# Patient Record
Sex: MALE
[De-identification: names, ages, dates, MRNs, and addresses within clinical notes are randomized per-mention and may not be internally consistent; named-entity substitution may affect disease eponyms.]

---

## 2018-08-07 ENCOUNTER — HOSPITAL ENCOUNTER (EMERGENCY)
Dept: HOSPITAL 97 - ER | Age: 43
Discharge: HOME | End: 2018-08-07
Payer: SELF-PAY

## 2018-08-07 DIAGNOSIS — Z72.0: ICD-10-CM
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
## 2018-08-07 DIAGNOSIS — S61.310A: Primary | ICD-10-CM
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
## 2018-08-07 DIAGNOSIS — Z23: ICD-10-CM

## 2018-08-07 PROCEDURE — 96372 THER/PROPH/DIAG INJ SC/IM: CPT

## 2018-08-07 PROCEDURE — 0JQJ0ZZ REPAIR RIGHT HAND SUBCUTANEOUS TISSUE AND FASCIA, OPEN APPROACH: ICD-10-PCS

## 2018-08-07 PROCEDURE — 99283 EMERGENCY DEPT VISIT LOW MDM: CPT

## 2018-08-07 PROCEDURE — 90714 TD VACC NO PRESV 7 YRS+ IM: CPT

## 2018-08-07 NOTE — ER
Nurse's Notes                                                                                     

 [facility]                                                                

Name: Otto Irving                                                                                  

Age: 42 yrs                                                                                       

Sex: Male                                                                                         

: 1975                                                                                   

MRN: [id number]                                                                                   

Arrival Date: 2018                                                                          

Time: 10:38                                                                                       

Account#: [id number]                                                                            

Bed 19                                                                                            

Private MD:                                                                                       

Diagnosis: Laceration without foreign body of right index finger with damage to nail              

                                                                                                  

Presentation:                                                                                     

                                                                                             

10:42 Presenting complaint: Patient states: Crush injury to right index finger with           hb  

      compressed air clamp machine. Transition of care: patient was not received from another     

      setting of care. Onset of symptoms was 2018. Care prior to arrival: None.        

10:42 Method Of Arrival: Ambulatory                                                           hb  

10:42 Acuity: KWAME 3                                                                           hb  

13:28 Risk Assessment: Do you want to hurt yourself or someone else? Patient reports no       aj1 

      desire to harm self or others. Initial Sepsis Screen: Does the patient meet any 2           

      criteria? No. Patient's initial sepsis screen is negative. Does the patient have a          

      suspected source of infection? Yes: Skin breakdown/wound.                                   

                                                                                                  

Historical:                                                                                       

- Allergies:                                                                                      

10:44 No Known Allergies;                                                                     hb  

- Home Meds:                                                                                      

10:44 None [Active];                                                                          hb  

- PMHx:                                                                                           

10:44 None;                                                                                   hb  

- PSHx:                                                                                           

10:44 None;                                                                                   hb  

                                                                                                  

- Immunization history:: Last tetanus immunization: unknown.                                      

- Social history:: Smoking status: Patient uses tobacco products, denies chronic                  

  smoking, but will smoke occasionally.                                                           

- Ebola Screening: : No symptoms or risks identified at this time.                                

                                                                                                  

                                                                                                  

Screening:                                                                                        

10:50 Abuse screen: Denies threats or abuse. Denies injuries from another. Nutritional        aj1 

      screening: No deficits noted. Tuberculosis screening: No symptoms or risk factors           

      identified.                                                                                 

13:28 Fall Risk None identified.                                                              aj1 

                                                                                                  

Assessment:                                                                                       

10:50 General: Appears in no apparent distress. uncomfortable, Behavior is calm, cooperative, aj1 

      appropriate for age. Pain: Complains of pain in right index finger. Neuro: Level of         

      Consciousness is awake, alert, obeys commands. Cardiovascular: Patient's skin is warm       

      and dry. Respiratory: Airway is patent Respiratory effort is even, unlabored,               

      Respiratory pattern is regular, symmetrical. GI: No signs and/or symptoms were reported     

      involving the gastrointestinal system. : No signs and/or symptoms were reported           

      regarding the genitourinary system. EENT: No signs and/or symptoms were reported            

      regarding the EENT system. Derm: No signs and/or symptoms reported regarding the            

      dermatologic system. Skin is pink, warm \T\ dry. normal. Musculoskeletal: Range of          

      motion: limited in DIP of right index finger. Injury Description: Laceration sustained      

      to dorsal aspect of distal phalanx of right index finger and palmar aspect of distal        

      phalanx of right index finger is bleeding moderately.                                       

11:50 Reassessment: Patient appears in no apparent distress at this time. No changes from     aj1 

      previously documented assessment. Patient and/or family updated on plan of care and         

      expected duration. Pain level reassessed. Patient is alert, oriented x 3, equal             

      unlabored respirations, skin warm/dry/pink.                                                 

12:23 Reassessment: WILLIAM Strange at bedside performing laceration repair.                    aj1 

12:50 Reassessment: Patient appears in no apparent distress at this time. No changes from     aj1 

      previously documented assessment. Patient and/or family updated on plan of care and         

      expected duration. Pain level reassessed. Patient is alert, oriented x 3, equal             

      unlabored respirations, skin warm/dry/pink.                                                 

                                                                                                  

Vital Signs:                                                                                      

10:42  / 107; Pulse 88; Resp 16; Temp 98.1; Pulse Ox 100% on R/A; Pain 9/10;            hb  

13:29  / 89; Pulse 82; Resp 18; Pulse Ox 99% on R/A;                                    aj1 

                                                                                                  

ED Course:                                                                                        

10:38 Patient arrived in ED.                                                                  hb  

10:39 Jay Rivero PA is PHCP.                                                               [id number] 

10:39 Nik Daly MD is Attending Physician.                                                [id number] 

10:44 Triage completed.                                                                       hb  

10:44 Arm band placed on right wrist.                                                         hb  

10:50 Patient has correct armband on for positive identification. Bed in low position. Call   aj1 

      light in reach. Side rails up X 1.                                                          

10:50 No provider procedures requiring assistance completed.                                  aj1 

11:48 X-ray completed. Portable x-ray completed in exam room. Patient tolerated procedure     ag1 

      well.                                                                                       

12:23 Mercedes Subramanian, RN is Primary Nurse.                                                   aj1 

12:35 Barrett Nevarez MD is Referral Physician.                                              [id number] 

13:26 Wound care: to laceration located on DIP of right index finger was cleaned with         aj1 

      Hibiclens, dressed with non-adherent dressing, covered with 4x4 and aluminum finger         

      splint and secured with with tape.                                                          

13:28 Patient did not have IV access during this emergency room visit.                        aj1 

                                                                                                  

Administered Medications:                                                                         

10:59 Drug: Tetanus-Diphtheria Toxoid Adult 0.5 ml {: [employer]. Exp:         aj1 

      2020. Lot #: [id number]. } Route: IM; Site: right deltoid;                                 

11:30 Follow up: Response: No adverse reaction                                                aj1 

10:59 Drug: Ancef 1 grams Route: IM; Site: right gluteus;                                     aj1 

11:30 Follow up: Response: No adverse reaction                                                aj1 

12:20 Drug: Lidocaine (1 %) 1 vials {Note: administered by WILLIAM Strange.} Volume: 5 ml;      aj1 

      Route: Infiltration;                                                                        

13:25 Follow up: Response: No adverse reaction                                                aj1 

                                                                                                  

                                                                                                  

Outcome:                                                                                          

12:35 Discharge ordered by MD. vital 

13:30 Discharged to home ambulatory.                                                          aj1 

13:30 Condition: good                                                                             

13:30 Discharge instructions given to patient, friend, Instructed on discharge instructions,      

      follow up and referral plans. medication usage, Demonstrated understanding of               

      instructions, follow-up care, medications, Prescriptions given X 2.                         

13:30 Patient left the ED.                                                                    aj1 

                                                                                                  

Signatures:                                                                                       

Mercedes Subramanian, RN                     RN   aj1                                                  

Jay Rivero PA PA jr8 Gallaway, Ashley                             1                                                  

Roseanna Bender RN                     RN                                                      

                                                                                                  

**************************************************************************************************

## 2018-08-07 NOTE — EDPHYS
Physician Documentation                                                                           

 [facility]                                                                

Name: Otto Irving                                                                                  

Age: 42 yrs                                                                                       

Sex: Male                                                                                         

: 1975                                                                                   

MRN: [id number]                                                                                   

Arrival Date: 2018                                                                          

Time: 10:38                                                                                       

Account#: [id number]                                                                            

Bed 19                                                                                            

Private MD:                                                                                       

ED Physician Nik Daly                                                                         

HPI:                                                                                              

                                                                                             

10:48 This 42 yrs old  Male presents to ER via Ambulatory with complaints of Finger   jr8 

      Injury.                                                                                     

10:48 The patient or guardian reports decreased range of motion, injury, a laceration, pain,  jr8 

      tenderness. The complaints affect the DIP of right index finger, tip of right index         

      finger. Context: The problem was sustained at work, resulted from getting finger caught     

      in clamp. Onset: The symptoms/episode began/occurred acutely, today. Modifying factors:     

      The symptoms are alleviated by nothing, the symptoms are aggravated by movement.            

      Associated signs and symptoms: The patient has no apparent associated signs or              

      symptoms. Severity of symptoms: At their worst the symptoms were moderate, in the           

      emergency department the symptoms are unchanged. The patient has not experienced            

      similar symptoms in the past. The patient has not recently seen a physician.                

                                                                                                  

Historical:                                                                                       

- Allergies:                                                                                      

10:44 No Known Allergies;                                                                     hb  

- Home Meds:                                                                                      

10:44 None [Active];                                                                          hb  

- PMHx:                                                                                           

10:44 None;                                                                                   hb  

- PSHx:                                                                                           

10:44 None;                                                                                   hb  

                                                                                                  

- Immunization history:: Last tetanus immunization: unknown.                                      

- Social history:: Smoking status: Patient uses tobacco products, denies chronic                  

  smoking, but will smoke occasionally.                                                           

- Ebola Screening: : No symptoms or risks identified at this time.                                

                                                                                                  

                                                                                                  

ROS:                                                                                              

10:48 Eyes: Negative for injury, pain, redness, and discharge, ENT: Negative for injury,      jr8 

      pain, and discharge, Neck: Negative for injury, pain, and swelling, Cardiovascular:         

      Negative for chest pain, palpitations, and edema, Respiratory: Negative for shortness       

      of breath, cough, wheezing, and pleuritic chest pain, Abdomen/GI: Negative for              

      abdominal pain, nausea, vomiting, diarrhea, and constipation, Back: Negative for injury     

      and pain, Skin: Negative for rash, and discoloration, Neuro: Negative for headache,         

      weakness, numbness, tingling, and seizure.                                                  

10:48 MS/extremity: Positive for decreased range of motion, laceration, pain, tenderness, of      

      the right distal index finger.                                                              

                                                                                                  

Exam:                                                                                             

10:48 Cardiovascular:  Regular rate and rhythm with a normal S1 and S2.  No gallops, murmurs, jr8 

      or rubs.  Normal PMI, no JVD.  No pulse deficits. Respiratory:  Lungs have equal breath     

      sounds bilaterally, clear to auscultation and percussion.  No rales, rhonchi or wheezes     

      noted.  No increased work of breathing, no retractions or nasal flaring. Skin:  Warm,       

      dry with normal turgor.  Normal color with no rashes, no lesions, and no evidence of        

      cellulitis. Neuro:  Awake and alert, GCS 15, oriented to person, place, time, and           

      situation.  Cranial nerves II-XII grossly intact.  Motor strength 5/5 in all                

      extremities.  Sensory grossly intact.  Cerebellar exam normal.  Normal gait.                

10:48 Musculoskeletal/extremity: Extremities: grossly normal except: noted in the right           

      distal index finger: Patient has laceration extending from medial to lateral side of        

      finger dorsal aspect including the nail, ROM: limited active range of motion, limited       

      passive range of motion, limited active range of motion due to pain, limited passive        

      range of motion due to pain, Circulation is intact in all extremities. Sensation            

      intact.                                                                                     

                                                                                                  

Vital Signs:                                                                                      

10:42  / 107; Pulse 88; Resp 16; Temp 98.1; Pulse Ox 100% on R/A; Pain 9/10;            hb  

13:29  / 89; Pulse 82; Resp 18; Pulse Ox 99% on R/A;                                    aj1 

                                                                                                  

Laceration:                                                                                       

12:31 Wound Repair of 1cm ( 0.4in ) subcutaneous laceration to DIP of right index finger.     jr8 

      Linear shaped.. Distal neuro/vascular/tendon intact. Anesthesia: Digital block              

      administered with 4 mls of 1% lidocaine. Wound prep: Extensive cleansing with betadine,     

      Wound irrigation with saline, Wound explored extensively, Copious irrigation. Skin          

      closed with 2 4-0 Prolene using interrupted sutures and sterile technique. Patient          

      tolerated well.                                                                             

12:31 Wound Repair of 1cm ( 0.4in ) subcutaneous laceration to dorsal aspect of distal        jr8 

      phalanx of right index finger. Distal neuro/vascular/tendon intact. Anesthesia: Digital     

      block administered with 4 mls of 1% lidocaine. Wound prep: Extensive cleansing with         

      betadine, Wound irrigation with saline, Wound explored extensively, Copious irrigation.     

      Skin closed with 2 4-0 Prolene using interrupted sutures and sterile technique. Patient     

      tolerated well.                                                                             

12:31 Wound Repair of 2.5cm ( 1.0in ) subcutaneous laceration to dorsal aspect of distal      jr8 

      phalanx of right index finger. Distal neuro/vascular/tendon intact. Anesthesia: Digital     

      block administered with 4 mls of 1% lidocaine. Wound prep: Extensive cleansing with         

      betadine, Wound irrigation with saline, Wound explored extensively, Copious irrigation.     

      Skin closed with 5 4-0 Prolene using interrupted sutures and sterile technique. Patient     

      tolerated well.                                                                             

12:31 Wound Repair of 2.5cm ( 1.0in ) tuft laceration to nailbed. Distal                      jr8 

      neuro/vascular/tendon intact. Anesthesia: Digital block administered with 4 mls of 1%       

      lidocaine. Wound prep: Extensive cleansing with betadine, Wound irrigation with saline,     

      Wound explored extensively, Copious irrigation. tuft closed with 4 4-0 Vicryl using         

      interrupted sutures and sterile technique. Patient tolerated well.                          

                                                                                                  

MDM:                                                                                              

10:39 Patient medically screened.                                                             jr8 

12:31 Data reviewed: vital signs, nurses notes, radiologic studies, plain films, and as a     jr8 

      result, I will discharge patient. Data interpreted: Pulse oximetry: on room air is 100      

      %. Interpretation: normal. Counseling: I had a detailed discussion with the patient         

      and/or guardian regarding: the historical points, exam findings, and any diagnostic         

      results supporting the discharge/admit diagnosis, radiology results, the need for           

      outpatient follow up, a hand specialist, to return to the emergency department if           

      symptoms worsen or persist or if there are any questions or concerns that arise at home.    

                                                                                                  

                                                                                             

10:47 Order name: XRAY Hand RIGHT 3 View                                                      [facility] 

                                                                                             

12:00 Order name: RAD; Complete Time: 12:36                                                   EDMS

                                                                                             

10:47 Order name: Dressing - Wound; Complete Time: 10:58                                      jr8 

                                                                                             

10:47 Order name: Gloves, Sterile; Complete Time: 10:58                                       jr8 

                                                                                             

10:47 Order name: Setup Suture Tray; Complete Time: 10:58                                     jr8 

                                                                                                  

Administered Medications:                                                                         

10:59 Drug: Tetanus-Diphtheria Toxoid Adult 0.5 ml {: NETpeas. Exp:         aj1 

      2020. Lot #: [id number]. } Route: IM; Site: right deltoid;                                 

11:30 Follow up: Response: No adverse reaction                                                aj1 

10:59 Drug: Ancef 1 grams Route: IM; Site: right gluteus;                                     aj1 

11:30 Follow up: Response: No adverse reaction                                                aj1 

12:20 Drug: Lidocaine (1 %) 1 vials {Note: administered by WILLIAM Strange.} Volume: 5 ml;      aj1 

      Route: Infiltration;                                                                        

13:25 Follow up: Response: No adverse reaction                                                aj1 

                                                                                                  

                                                                                                  

Disposition:                                                                                      

18:40 Co-signature as Attending Physician, Nik Daly MD.                                    rn  

                                                                                                  

Disposition:                                                                                      

18 12:35 Discharged to Home. Impression: Laceration without foreign body of right index     

  finger with damage to nail.                                                                     

- Condition is Stable.                                                                            

- Discharge Instructions: Finger Fracture, Laceration Care, Adult.                                

- Prescriptions for Ibuprofen 800 mg Oral Tablet - take 1 tablet by ORAL route every 12           

  hours As needed take with food; 20 tablet. Keflex 500 mg Oral Capsule - take 1                  

  capsule by ORAL route every 6 hours for 10 days; 40 capsule.                                    

- Medication Reconciliation Form, Thank You Letter, Antibiotic Education, Prescription            

  Opioid Use form.                                                                                

- Follow up: Barrett Nevarez MD; When: 1 week; Reason: Wound Recheck, Recheck today's            

  complaints, Continuance of care, Staple/Suture removal, Re-evaluation by your                   

  physician.                                                                                      

- Problem is new.                                                                                 

- Symptoms have improved.                                                                         

                                                                                                  

                                                                                                  

                                                                                                  

Signatures:                                                                                       

Dispatcher [employer]                           EDMS                                                 

Mercedes Subramanian RN                     RN   aj1                                                  

Nik Daly MD MD rn Roszak, Josh, PA PA   jr8                                                  

Roseanna Bender RN RN                                                      

                                                                                                  

Corrections: (The following items were deleted from the chart)                                    

13:30 12:35 2018 12:35 Discharged to Home. Impression: Laceration without foreign body  aj1 

      of right index finger with damage to nail. Condition is Stable. Forms are Medication        

      Reconciliation Form, Thank You Letter, Antibiotic Education, Prescription Opioid Use.       

      Follow up: Barrett Nevarez; When: 1 week; Reason: Wound Recheck, Recheck today's             

      complaints, Continuance of care, Staple/Suture removal, Re-evaluation by your               

      physician. Problem is new. Symptoms have improved. jr8                                      

                                                                                                  

**************************************************************************************************

## 2018-08-07 NOTE — RAD REPORT
EXAM DESCRIPTION:  RAD - Hand Right 3 View - 8/7/2018 11:52 am

 

CLINICAL HISTORY:  partial amputation

 

COMPARISON:  No comparisons

 

FINDINGS:  A tuft fracture is present involving the second digit. Moderate soft tissue swelling is ev
ident.

## 2018-08-15 ENCOUNTER — HOSPITAL ENCOUNTER (EMERGENCY)
Dept: HOSPITAL 97 - ER | Age: 43
Discharge: HOME | End: 2018-08-15
Payer: SELF-PAY

## 2018-08-15 DIAGNOSIS — Z48.02: Primary | ICD-10-CM

## 2018-08-15 PROCEDURE — 99281 EMR DPT VST MAYX REQ PHY/QHP: CPT

## 2018-08-15 NOTE — ER
Nurse's Notes                                                                                     

 [facility]                                                                

Name: Otto Irving                                                                                  

Age: 42 yrs                                                                                       

Sex: Male                                                                                         

: 1975                                                                                   

MRN: [id number]                                                                                   

Arrival Date: 08/15/2018                                                                          

Time: 14:48                                                                                       

Account#: [id number]                                                                            

Bed 11                                                                                            

Private MD: None, None                                                                            

Diagnosis: Encounter for removal of sutures                                                       

                                                                                                  

Presentation:                                                                                     

08/15                                                                                             

14:56 Presenting complaint: Patient states: I had stitches put in on last Tuesday, so i came  sg  

      today to see if i can have them taken out. Transition of care: patient was not received     

      from another setting of care. Onset of symptoms was August 15, 2018. Risk Assessment:       

      Do you want to hurt yourself or someone else? Patient reports no desire to harm self or     

      others. Initial Sepsis Screen: Does the patient meet any 2 criteria? No. Patient's          

      initial sepsis screen is negative. Does the patient have a suspected source of              

      infection? No. Patient's initial sepsis screen is negative. Care prior to arrival: None.    

14:56 Acuity: KWAME 5                                                                           sg  

14:56 Method Of Arrival: Ambulatory                                                           sg  

                                                                                                  

Historical:                                                                                       

- Allergies:                                                                                      

14:56 No Known Allergies;                                                                     sg  

- Home Meds:                                                                                      

14:56 None [Active];                                                                          sg  

- PMHx:                                                                                           

14:56 None;                                                                                   sg  

- PSHx:                                                                                           

14:56 None;                                                                                   sg  

                                                                                                  

- Immunization history:: Adult Immunizations up to date.                                          

- Social history:: Smoking status: Patient/guardian denies using tobacco.                         

- Ebola Screening: : Patient negative for fever greater than or equal to 101.5 degrees            

  Fahrenheit, and additional compatible Ebola Virus Disease symptoms Patient denies               

  exposure to infectious person Patient denies travel to an Ebola-affected area in the            

  21 days before illness onset No symptoms or risks identified at this time.                      

                                                                                                  

                                                                                                  

Vital Signs:                                                                                      

14:57  / 90; Pulse 65; Resp 17; Temp 97.7; Pulse Ox 98% on R/A; Pain 0/10;              sg  

                                                                                                  

ED Course:                                                                                        

14:48 Patient arrived in ED.                                                                  sb2 

14:48 None, None is Private Physician.                                                        sb2 

14:56 [facility]                                                        kb  

14:56 Cyrus Ornelas MD is Attending Physician.                                              kb  

14:56 Arm band placed on.                                                                     sg  

14:57 Triage completed.                                                                       sg  

                                                                                                  

Administered Medications:                                                                         

No medications were administered                                                                  

                                                                                                  

                                                                                                  

Outcome:                                                                                          

15:14 Discharge ordered by MD.                                                                kb  

15:39 Patient left the ED.                                                                    sg  

                                                                                                  

Signatures:                                                                                       

Latasha Cedillo, OLEGARIO-C                 FNP-Phillip Sheehan, RN                         RN   sg                                                   

Garima Maki                               sb2                                                  

                                                                                                  

**************************************************************************************************

## 2018-08-15 NOTE — EDPHYS
Physician Documentation                                                                           

 [facility]                                                                

Name: Otto Irving                                                                                  

Age: 42 yrs                                                                                       

Sex: Male                                                                                         

: 1975                                                                                   

MRN: [id number]                                                                                   

Arrival Date: 08/15/2018                                                                          

Time: 14:48                                                                                       

Account#: [id number]                                                                            

Bed 11                                                                                            

Private MD: None, None                                                                            

ED Physician Cyrus Ornelas                                                                       

HPI:                                                                                              

08/15                                                                                             

15:13 This 42 yrs old  Male presents to ER via Ambulatory with complaints of Suture   kb  

      Removal.                                                                                    

15:13 The patient has sutures on the dorsal aspect of distal phalanx of right index finger.   kb  

      Previous treatment: The patient was initially treated on 2018, the care was      

      rendered at [facility], Treatment type: The patient's original       

      treatment included sutures. Sutures/staples progress: The patient has no c/o's. The         

      wound is well-healing with no redness, swelling, discharge, or dehiscence reported. The     

      patient has not experienced similar symptoms in the past. The patient has been recently     

      seen at the [facility] Emergency Department, last week.              

                                                                                                  

Historical:                                                                                       

- Allergies:                                                                                      

14:56 No Known Allergies;                                                                     sg  

- Home Meds:                                                                                      

14:56 None [Active];                                                                          sg  

- PMHx:                                                                                           

14:56 None;                                                                                   sg  

- PSHx:                                                                                           

14:56 None;                                                                                   sg  

                                                                                                  

- Immunization history:: Adult Immunizations up to date.                                          

- Social history:: Smoking status: Patient/guardian denies using tobacco.                         

- Ebola Screening: : Patient negative for fever greater than or equal to 101.5 degrees            

  Fahrenheit, and additional compatible Ebola Virus Disease symptoms Patient denies               

  exposure to infectious person Patient denies travel to an Ebola-affected area in the            

  21 days before illness onset No symptoms or risks identified at this time.                      

                                                                                                  

                                                                                                  

ROS:                                                                                              

15:07 Constitutional: Negative for fever, chills, and weight loss, Cardiovascular: Negative   kb  

      for chest pain, palpitations, and edema, Respiratory: Negative for shortness of breath,     

      cough, wheezing, and pleuritic chest pain, Abdomen/GI: Negative for abdominal pain,         

      nausea, vomiting, diarrhea, and constipation, MS/Extremity: Negative for injury and         

      deformity, Neuro: Negative for headache, weakness, numbness, tingling, and seizure.         

15:07 Skin: Positive for laceration(s), of the dorsal aspect of distal phalanx of right index     

      finger.                                                                                     

                                                                                                  

Exam:                                                                                             

15:07 Constitutional:  This is a well developed, well nourished patient who is awake, alert,  kb  

      and in no acute distress. Head/Face:  Normocephalic, atraumatic. Chest/axilla:  Normal      

      chest wall appearance and motion.  Nontender with no deformity.  No lesions are             

      appreciated. Cardiovascular:  Regular rate and rhythm with a normal S1 and S2.  No          

      gallops, murmurs, or rubs.  Normal PMI, no JVD.  No pulse deficits. Respiratory:  Lungs     

      have equal breath sounds bilaterally, clear to auscultation and percussion.  No rales,      

      rhonchi or wheezes noted.  No increased work of breathing, no retractions or nasal          

      flaring. Abdomen/GI:  Soft, non-tender, with normal bowel sounds.  No distension or         

      tympany.  No guarding or rebound.  No evidence of tenderness throughout. MS/ Extremity:     

       Pulses equal, no cyanosis.  Neurovascular intact.  Full, normal range of motion.           

      Neuro:  Awake and alert, GCS 15, oriented to person, place, time, and situation.            

      Cranial nerves II-XII grossly intact.  Motor strength 5/5 in all extremities.  Sensory      

      grossly intact.  Cerebellar exam normal.  Normal gait.                                      

15:07 Skin: Wound recheck: Suture laceration closure: the wound is healing well, the edges        

      are well approximated, no evidence of dehiscence, no drainage, no erythema, no swelling.    

                                                                                                  

Vital Signs:                                                                                      

14:57  / 90; Pulse 65; Resp 17; Temp 97.7; Pulse Ox 98% on R/A; Pain 0/10;              sg  

                                                                                                  

Procedures:                                                                                       

15:07 Suture/Staple removal: Removed 10 sutures, from dorsal aspect of distal phalanx of      kb  

      right index finger, site appears well healed, dressed with band aid, Patient tolerated      

      well.                                                                                       

                                                                                                  

MDM:                                                                                              

14:57 Patient medically screened.                                                             kb  

15:07 Data reviewed: vital signs, nurses notes. Data interpreted: Pulse oximetry: on room air kb  

      is 98 %. Interpretation: normal. Counseling: I had a detailed discussion with the           

      patient and/or guardian regarding: the historical points, exam findings, and any            

      diagnostic results supporting the discharge/admit diagnosis, the need for outpatient        

      follow up, a family practitioner, to return to the emergency department if symptoms         

      worsen or persist or if there are any questions or concerns that arise at home.             

                                                                                                  

Administered Medications:                                                                         

No medications were administered                                                                  

                                                                                                  

                                                                                                  

Disposition:                                                                                      

19:06 Co-signature as Attending Physician, Cyrus Ornelas MD I agree with the assessment and   kdr 

      plan of care.                                                                               

                                                                                                  

Disposition:                                                                                      

08/15/18 15:14 Discharged to Home. Impression: Encounter for removal of sutures.                  

- Condition is Stable.                                                                            

- Discharge Instructions: Suture Removal, Care After.                                             

                                                                                                  

- Medication Reconciliation Form, Thank You Letter, Antibiotic Education, Prescription            

  Opioid Use form.                                                                                

- Follow up: Emergency Department; When: As needed; Reason: Worsening of condition.               

  Follow up: Private Physician; When: 2 - 3 days; Reason: Recheck today's complaints,             

  Continuance of care, Re-evaluation by your physician.                                           

                                                                                                  

                                                                                                  

                                                                                                  

Signatures:                                                                                       

Latasha Cedillo, SUMMER MELVIN-Phillip Sheehan RN RN sg Rittger, Kevin, MD MD   [facility]                                                  

                                                                                                  

Corrections: (The following items were deleted from the chart)                                    

15:39 15:14 08/15/2018 15:14 Discharged to Home. Impression: Encounter for removal of         sg  

      sutures. Condition is Stable. Forms are Medication Reconciliation Form, Thank You           

      Letter, Antibiotic Education, Prescription Opioid Use. Follow up: Emergency Department;     

      When: As needed; Reason: Worsening of condition. Follow up: Private Physician; When: 2      

      - 3 days; Reason: Recheck today's complaints, Continuance of care, Re-evaluation by         

      your physician. kb                                                                          

                                                                                                  

**************************************************************************************************

## 2023-08-03 ENCOUNTER — HOSPITAL ENCOUNTER (EMERGENCY)
Dept: HOSPITAL 97 - ER | Age: 48
Discharge: HOME | End: 2023-08-03
Payer: SELF-PAY

## 2023-08-03 VITALS — OXYGEN SATURATION: 99 % | DIASTOLIC BLOOD PRESSURE: 93 MMHG | SYSTOLIC BLOOD PRESSURE: 172 MMHG | TEMPERATURE: 98.6 F

## 2023-08-03 DIAGNOSIS — S02.2XXA: Primary | ICD-10-CM

## 2023-08-03 PROCEDURE — 99283 EMERGENCY DEPT VISIT LOW MDM: CPT

## 2023-08-03 PROCEDURE — 70160 X-RAY EXAM OF NASAL BONES: CPT
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
## 2023-08-03 NOTE — RAD REPORT
EXAM DESCRIPTION:  RAD - Nasal Bones - 8/3/2023 5:16 pm

 

CLINICAL HISTORY:  FACIAL PAIN

 

COMPARISON:  No comparisons

 

FINDINGS:  Minimal nasal bone fracture is seen. The paranasal sinuses and mastoids are clear.

 

IMPRESSION:  Minimal nasal bone fracture.

## 2023-08-03 NOTE — EDPHYS
Physician Documentation                                                                           

 [facility]                                                                 

Name: Otto Irving                                                                                  

Age: 47 yrs                                                                                       

Sex: Male                                                                                         

: 1975                                                                                   

MRN: [id number]                                                                                   

Arrival Date: 2023                                                                          

Time: 16:08                                                                                       

Account#: [id number]                                                                            

Bed 10                                                                                            

Private MD:                                                                                       

ED Physician Nik Daly                                                                         

HPI:                                                                                              

                                                                                             

17:37 This 47 yrs old  Male presents to ER via Wheelchair with complaints of Facial   kb  

      Injury, Nose Bleed.                                                                         

17:37 The patient or guardian reports abrasion, injury, pain, swelling, tenderness. The       kb  

      complaints affect the nose. Context of injury: The problem was sustained at work,           

      resulted from a direct blow, piece of wood . Onset: The symptoms/episode began/occurred     

      just prior to arrival. Associated signs and symptoms: The patient has no apparent           

      associated signs or symptoms, Loss of consciousness: This patient did not experience        

      any loss of consciousness. Severity of symptoms: At their worst the symptoms were           

      moderate, in the emergency department the symptoms are unchanged. The patient has not       

      experienced similar symptoms in the past. The patient has not recently seen a               

      physician. Pt was building cabinets and a piece of wood hit him in the nose just pta.       

      Denies any other injury or pain. Denies loc.                                                

                                                                                                  

Historical:                                                                                       

- Allergies:                                                                                      

16:31 No Known Allergies;                                                                     ss  

- PMHx:                                                                                           

16:31 None;                                                                                   ss  

- PSHx:                                                                                           

16:31 Appendectomy;                                                                           ss  

                                                                                                  

- Immunization history:: Client reports having NOT received the Covid vaccine.                    

- Social history:: Smoking status: Patient denies any tobacco usage or history of.                

                                                                                                  

                                                                                                  

ROS:                                                                                              

17:34 Constitutional: Negative for fever, chills, and weight loss.                            kb  

17:34 ENT: Positive for nose bleed, nose trauma.                                                  

17:34 Skin: Positive for abrasion(s), of the bridge of nose.                                      

17:34 All other systems are negative.                                                             

                                                                                                  

Exam:                                                                                             

17:35 Constitutional:  This is a well developed, well nourished patient who is awake, alert,  kb  

      and in no acute distress. Head/Face:  Normocephalic, atraumatic. Cardiovascular:            

      Regular rate and rhythm with a normal S1 and S2.  No gallops, murmurs, or rubs.  No         

      pulse deficits. Respiratory:  Respirations even and unlabored. No increased work of         

      breathing. Talking in full sentences MS/ Extremity:  Pulses equal, no cyanosis.             

      Neurovascular intact.  Full, normal range of motion. Neuro:  Awake and alert, GCS 15,       

      oriented to person, place, time, and situation. Moves all extremities. Normal gait.         

17:35 ENT: Nose: External nose: abrasion is noted, swelling is noted, clotted blood, in both      

      nares.                                                                                      

                                                                                                  

Vital Signs:                                                                                      

16:31  / 93; Pulse 78; Resp 16; Temp 98.6; Pulse Ox 99% ; Pain 9/10;                    ss  

16:31 Pain Scale: Adult                                                                       ss  

                                                                                                  

Hamilton Coma Score:                                                                               

17:36 Eye Response: spontaneous(4). Motor Response: obeys commands(6). Verbal Response:       kb  

      oriented(5). Total: 15.                                                                     

17:37 Eye Response: spontaneous(4). Motor Response: obeys commands(6). Verbal Response:       kb  

      oriented(5). Total: 15.                                                                     

                                                                                                  

MDM:                                                                                              

16:26 Patient medically screened.                                                             kb  

17:36 Differential diagnosis: Contusion of Hematoma on Intracranial bleed- Concussion. Data   kb  

      reviewed: vital signs, nurses notes. Counseling: I had a detailed discussion with the       

      patient and/or guardian regarding: the historical points, exam findings, and any            

      diagnostic results supporting the discharge/admit diagnosis, radiology results, the         

      need for outpatient follow up, an ENT specialist, to return to the emergency department     

      if symptoms worsen or persist or if there are any questions or concerns that arise at       

      home.                                                                                       

                                                                                                  

                                                                                             

16:35 Order name: Nasal Bones XRAY; Complete Time: 17:29                                      kb  

                                                                                             

17:29 Order name: Wound Care; Complete Time: 18:00                                            kb  

                                                                                                  

Administered Medications:                                                                         

16:41 Drug: Norco PO 10 mg-325 mg 1 tabs Route: PO;                                           ko1 

18:00 Follow up: Response: No adverse reaction                                                ss  

                                                                                                  

                                                                                                  

Disposition Summary:                                                                              

23 17:38                                                                                    

Discharge Ordered                                                                                 

      Location: Home                                                                          kb  

      Condition: Stable                                                                       kb  

      Diagnosis                                                                                   

        - Fracture of nasal bones                                                             kb  

      Followup:                                                                               kb  

        - With: Emergency Department                                                               

        - When: As needed                                                                          

        - Reason: Worsening of condition                                                           

      Followup:                                                                               kb  

        - With: Private Physician                                                                  

        - When: 2 - 3 days                                                                         

        - Reason: Recheck today's complaints, Continuance of care, Re-evaluation by your           

      physician                                                                                   

      Discharge Instructions:                                                                     

        - Discharge Summary Sheet                                                             kb  

        - Nasal Fracture, Easy-to-Read                                                        kb  

      Forms:                                                                                      

        - Medication Reconciliation Form                                                      kb  

        - Thank You Letter                                                                    kb  

        - Antibiotic Education                                                                kb  

        - Prescription Opioid Use                                                             kb  

        - Patient Portal Instructions                                                         kb  

      Prescriptions:                                                                              

        - Augmentin 875-125 mg Oral Tablet                                                         

            - take 1 tablet by ORAL route every 12 hours for 10 days; 20 tablet; Refills: 0,  kb  

      Product Selection Permitted                                                                 

        - Ibuprofen 800 mg Oral Tablet                                                             

            - take 1 tablet by ORAL route every 8 hours As needed take with food; 30 tablet;  kb  

      Refills: 0, Product Selection Permitted                                                     

Addendum:                                                                                         

2023                                                                                        

     10:51 Co-signature as Attending Physician, Nik Daly MD I reviewed the patient's care       r
n

           provided by the Advanced Practice Provider and agree with the diagnosis and treatment  

           plan.                                                                                  

                                                                                                  

Signatures:                                                                                       

Dispatcher [employer]                           EDMS                                                 

Latasha Cedillo, FNP-C                 FNP-Ckb                                                   

Nik Daly MD MD rn Blanchard, Shelby, RN                   RN   Kaya Torres RN                       RN   ko1                                                  

                                                                                                  

**************************************************************************************************